# Patient Record
Sex: FEMALE | Race: WHITE
[De-identification: names, ages, dates, MRNs, and addresses within clinical notes are randomized per-mention and may not be internally consistent; named-entity substitution may affect disease eponyms.]

---

## 2018-12-21 NOTE — PREOPHP
Date of Admission:  2018



History Of Present Illness:  Ms. Barrera is a 33-year-old, ,  female,  3, para 
2-0-0-2, now at what will be 39 weeks gestation.  She is admitted for elective induction of labor sec
ondary to term pregnancy with favorable cervix and no symptoms of HSV infection.  She denies recent c
ough, cold, fever, chills or UTI symptoms.  Infant has been active.



Past Medical History:  Please see prenatal record.



Family History:  Please see prenatal record.



Review of Systems:

She reports no recent cough, cold, fever, or chills.  No breast knots or lumps.  The baby has been ac
tive.  She denies any vaginal bleeding or spotting.  She denies any HSV symptoms.  She denies any bow
el problems.



Physical Examination:

General:  A short-statured  female in no apparent distress. 

Neck:  Supple without adenopathy or thyromegaly. 

Lungs:  Clear. 

Cardiac:  Regular rate and rhythm without murmurs. 

Breasts:  Not examined. 

Abdomen:  Estimated fetal weight is 7+ pounds. 

Pelvic:  Cervix noted to be approximately 3 cm dilated, 2+ to 3 cm, vertex, at -1 station. 

Extremities:  Trace lower extremity edema.



Impression:  Term pregnancy, Rh negative blood type.  History of herpes simplex virus infection, now 
on Valtrex.



Plan:  The patient will be induced on Saturday.  She has signed a permit in my presence.





ROGELIO/CICI

DD:  2018 11:19:03Voice ID:  [id number]

## 2018-12-22 ENCOUNTER — HOSPITAL ENCOUNTER (INPATIENT)
Dept: HOSPITAL 97 - 2ND-WC | Age: 33
LOS: 1 days | Discharge: HOME | End: 2018-12-23
Attending: SPECIALIST | Admitting: SPECIALIST
Payer: COMMERCIAL

## 2018-12-22 VITALS — BODY MASS INDEX: 29.9 KG/M2

## 2018-12-22 DIAGNOSIS — O98.313: ICD-10-CM

## 2018-12-22 DIAGNOSIS — O26.893: Primary | ICD-10-CM

## 2018-12-22 DIAGNOSIS — Z67.91: ICD-10-CM

## 2018-12-22 DIAGNOSIS — Z3A.39: ICD-10-CM

## 2018-12-22 DIAGNOSIS — A60.00: ICD-10-CM

## 2018-12-22 LAB
HCT VFR BLD CALC: 39.3 % (ref 36–45)
LYMPHOCYTES # SPEC AUTO: 2.1 K/UL (ref 0.7–4.9)
PMV BLD: 8.6 FL (ref 7.6–11.3)
RBC # BLD: 4.5 M/UL (ref 3.86–4.86)
UA DIPSTICK W REFLEX MICRO PNL UR: (no result)

## 2018-12-22 PROCEDURE — 81003 URINALYSIS AUTO W/O SCOPE: CPT

## 2018-12-22 PROCEDURE — 85461 HEMOGLOBIN FETAL: CPT

## 2018-12-22 PROCEDURE — 86850 RBC ANTIBODY SCREEN: CPT

## 2018-12-22 PROCEDURE — 86592 SYPHILIS TEST NON-TREP QUAL: CPT

## 2018-12-22 PROCEDURE — 86901 BLOOD TYPING SEROLOGIC RH(D): CPT

## 2018-12-22 PROCEDURE — 85025 COMPLETE CBC W/AUTO DIFF WBC: CPT

## 2018-12-22 PROCEDURE — 87340 HEPATITIS B SURFACE AG IA: CPT

## 2018-12-22 PROCEDURE — 10907ZC DRAINAGE OF AMNIOTIC FLUID, THERAPEUTIC FROM PRODUCTS OF CONCEPTION, VIA NATURAL OR ARTIFICIAL OPENING: ICD-10-PCS

## 2018-12-22 PROCEDURE — 3E0234Z INTRODUCTION OF SERUM, TOXOID AND VACCINE INTO MUSCLE, PERCUTANEOUS APPROACH: ICD-10-PCS

## 2018-12-22 PROCEDURE — 3E033VJ INTRODUCTION OF OTHER HORMONE INTO PERIPHERAL VEIN, PERCUTANEOUS APPROACH: ICD-10-PCS

## 2018-12-22 PROCEDURE — 36415 COLL VENOUS BLD VENIPUNCTURE: CPT

## 2018-12-22 PROCEDURE — 85014 HEMATOCRIT: CPT

## 2018-12-22 NOTE — P.BOP
Preoperative diagnosis: 39 week pregnancy


Postoperative diagnosis: same, delivery viable male infant


Primary procedure: SCVD


Secondary procedure: repair 2 laceration


Estimated blood loss: 200


Anesthesia: Local


Complications: None


Transferred to: Other (272)


Condition: Good

## 2018-12-23 VITALS — DIASTOLIC BLOOD PRESSURE: 68 MMHG | TEMPERATURE: 97.4 F | SYSTOLIC BLOOD PRESSURE: 118 MMHG

## 2018-12-25 LAB — RPR SER QL: (no result)

## 2019-03-06 NOTE — OP
Surgeon:  Kd Basurto MD



Ms. Barrera is a 33-year-old   female,  3, para 2-0-0-2, at 39 weeks gestation,
 admitted for elective induction of labor.  She was noted to be approximately a 1 + 2 cm on admission
.  After rupture of membranes and Pitocin induction of labor, she had a first stage of labor of 4 ye
rs and 13 minutes, second stage of labor of 7 minutes.  She delivered by spontaneous controlled vagin
al delivery over intact perineum a 7 pounds 7 ounce male infant.  After delayed cord clamping, the co
rd was clamped, cut, and the infant placed on mother's upper abdomen.  Cord blood was obtained.  Plac
enta was spontaneously expelled, appeared to be intact.  Intrauterine exam revealed no retained place
ntal fragments.  She delivered with 1 dose of Stadol 1 mg and 12.5 mg IV dose of Phenergan x1.  Estim
ated total blood loss was less than 200 cc.  The patient tolerated all procedures well.





ROGELIO/CICI

DD:  2019 21:10:07Voice ID:  [id number]

DT:  2019 04:56:58Report ID:  [id number]

## 2019-10-04 ENCOUNTER — HOSPITAL ENCOUNTER (OUTPATIENT)
Dept: HOSPITAL 97 - ER | Age: 34
Setting detail: OBSERVATION
LOS: 1 days | Discharge: HOME | End: 2019-10-05
Attending: SURGERY | Admitting: SURGERY
Payer: COMMERCIAL

## 2019-10-04 VITALS — BODY MASS INDEX: 25.6 KG/M2

## 2019-10-04 DIAGNOSIS — K35.80: Primary | ICD-10-CM

## 2019-10-04 DIAGNOSIS — Z88.3: ICD-10-CM

## 2019-10-04 DIAGNOSIS — Z88.6: ICD-10-CM

## 2019-10-04 LAB
BUN BLD-MCNC: 15 MG/DL (ref 7–18)
GLUCOSE SERPLBLD-MCNC: 91 MG/DL (ref 74–106)
HCT VFR BLD CALC: 41.8 % (ref 36–45)
LYMPHOCYTES # SPEC AUTO: 1.9 K/UL (ref 0.7–4.9)
PMV BLD: 8.6 FL (ref 7.6–11.3)
POTASSIUM SERPL-SCNC: 3.6 MMOL/L (ref 3.5–5.1)
RBC # BLD: 4.81 M/UL (ref 3.86–4.86)

## 2019-10-04 PROCEDURE — 80048 BASIC METABOLIC PNL TOTAL CA: CPT

## 2019-10-04 PROCEDURE — 0DTJ4ZZ RESECTION OF APPENDIX, PERCUTANEOUS ENDOSCOPIC APPROACH: ICD-10-PCS

## 2019-10-04 PROCEDURE — 85025 COMPLETE CBC W/AUTO DIFF WBC: CPT

## 2019-10-04 PROCEDURE — 99285 EMERGENCY DEPT VISIT HI MDM: CPT

## 2019-10-04 PROCEDURE — 96365 THER/PROPH/DIAG IV INF INIT: CPT

## 2019-10-04 PROCEDURE — 96361 HYDRATE IV INFUSION ADD-ON: CPT

## 2019-10-04 PROCEDURE — 88304 TISSUE EXAM BY PATHOLOGIST: CPT

## 2019-10-04 PROCEDURE — 81025 URINE PREGNANCY TEST: CPT

## 2019-10-04 PROCEDURE — 81003 URINALYSIS AUTO W/O SCOPE: CPT

## 2019-10-04 PROCEDURE — 74177 CT ABD & PELVIS W/CONTRAST: CPT

## 2019-10-04 PROCEDURE — 44970 LAPAROSCOPY APPENDECTOMY: CPT

## 2019-10-04 PROCEDURE — 36415 COLL VENOUS BLD VENIPUNCTURE: CPT

## 2019-10-04 RX ADMIN — SODIUM CHLORIDE SCH MLS: 0.9 INJECTION, SOLUTION INTRAVENOUS at 17:18

## 2019-10-04 NOTE — ER
Nurse's Notes                                                                                     

 [facility]                                                                 

Name: Nelly Barrera                                                                                 

Age: 34 yrs                                                                                       

Sex: Female                                                                                       

: 1985                                                                                   

MRN: [id number]                                                                                   

Arrival Date: 10/04/2019                                                                          

Time: 10:08                                                                                       

Account#: [id number]                                                                            

Bed 26                                                                                            

Private MD:                                                                                       

Diagnosis: Acute appendicitis                                                                     

                                                                                                  

Presentation:                                                                                     

10/04                                                                                             

10:35 Presenting complaint: Patient states: nausea that began last night with RLQ pain. Seen  ss  

      by PCP this morning who sent patient to r/o appy. Transition of care: patient was not       

      received from another setting of care. Onset of symptoms was October 3, 2019. Risk          

      Assessment: Do you want to hurt yourself or someone else? Patient reports no desire to      

      harm self or others. Initial Sepsis Screen: Does the patient meet any 2 criteria? No.       

      Patient's initial sepsis screen is negative. Does the patient have a suspected source       

      of infection? No. Patient's initial sepsis screen is negative. Care prior to arrival:       

      None.                                                                                       

10:35 Method Of Arrival: Ambulatory                                                           ss  

10:35 Acuity: KELLY 3                                                                           ss  

                                                                                                  

OB/GYN:                                                                                           

10:38 LMP 2019                                                                            ss  

                                                                                                  

Historical:                                                                                       

- Allergies:                                                                                      

10:38 Codeine;                                                                                ss  

10:38 Zithromax;                                                                              ss  

10:38 Aspirin;                                                                                ss  

10:38 Red Dye;                                                                                ss  

10:38 Tape;                                                                                   ss  

- Home Meds:                                                                                      

10:38 None [Active];                                                                          ss  

- PMHx:                                                                                           

10:38 None;                                                                                   ss  

- PSHx:                                                                                           

10:38 None;                                                                                   ss  

                                                                                                  

- Immunization history:: Adult Immunizations up to date.                                          

- Social history:: Smoking status: Patient/guardian denies using tobacco.                         

- Ebola Screening: : Patient denies exposure to infectious person Patient denies travel           

  to an Ebola-affected area in the 21 days before illness onset.                                  

                                                                                                  

                                                                                                  

Screenin:30 Abuse screen: Denies threats or abuse. Denies injuries from another. Nutritional        aj1 

      screening: No deficits noted. Tuberculosis screening: No symptoms or risk factors           

      identified.                                                                                 

15:16 Fall Risk No fall in past 12 months (0 pts). No secondary diagnosis (0 pts). IV access  aj1 

      (20 points). Ambulatory Aid- None/Bed Rest/Nurse Assist (0 pts). Gait- Normal/Bed           

      Rest/Wheelchair (0 pts) Mental Status- Oriented to own ability (0 pts). Total Payne         

      Fall Scale indicates No Risk (0-24 pts).                                                    

                                                                                                  

Assessment:                                                                                       

11:30 General: Appears in no apparent distress. comfortable, Behavior is calm, cooperative,   aj1 

      appropriate for age. Pain: Complains of pain in right lower quadrant Pain does not          

      radiate. Pain began 1 day ago. Is continuous. Neuro: Level of Consciousness is awake,       

      alert, obeys commands, Oriented to person, place, time, situation. Cardiovascular:          

      Patient's skin is warm and dry. Respiratory: Airway is patent Respiratory effort is         

      even, unlabored, Respiratory pattern is regular, symmetrical. GI: Abdomen is                

      non-distended, Bowel sounds present X 4 quads. Abd is soft X 4 quads Abdomen is tender      

      to palpation in right lower quadrant Reports nausea, Patient currently denies diarrhea,     

      vomiting. : No signs and/or symptoms were reported regarding the genitourinary            

      system. EENT: No signs and/or symptoms were reported regarding the EENT system. Derm:       

      No signs and/or symptoms reported regarding the dermatologic system. Skin is pink, warm     

      \T\ dry. normal. Musculoskeletal: No signs and/or symptoms reported regarding the           

      musculoskeletal system. Circulation, motion, and sensation intact.                          

11:44 Reassessment: Notified CT that patient finished her contrast at 11:15.                  aj1 

12:30 Reassessment: Patient appears in no apparent distress at this time. No changes from     aj1 

      previously documented assessment. Patient and/or family updated on plan of care and         

      expected duration. Pain level reassessed. Patient is alert, oriented x 3, equal             

      unlabored respirations, skin warm/dry/pink.                                                 

13:30 Reassessment: Patient and/or family updated on plan of care and expected duration. Pain aj1 

      level reassessed. General: Appears in no apparent distress. comfortable, Behavior is        

      calm, cooperative. Neuro: Level of Consciousness is awake, alert, obeys commands,           

      Oriented to person, place, time, situation. Cardiovascular: Patient's skin is warm and      

      dry. Respiratory: Airway is patent Respiratory effort is even, unlabored, Respiratory       

      pattern is regular, symmetrical. GI: Abdomen is non-distended. Derm: No signs and/or        

      symptoms reported regarding the dermatologic system. Skin is pink, warm \T\ dry. normal.    

      Musculoskeletal: Circulation, motion, and sensation intact.                                 

14:30 Reassessment: Patient appears in no apparent distress at this time. No changes from     aj1 

      previously documented assessment. Patient and/or family updated on plan of care and         

      expected duration. Pain level reassessed. Patient is alert, oriented x 3, equal             

      unlabored respirations, skin warm/dry/pink.                                                 

14:45 Reassessment: Dr. Storm at bedside to discuss POC with patient.                       aj1 

14:55 Reassessment: Patient changed into hospital gown in anticipation of surgery. All of the aj1 

      patient's belongings were given to her .                                             

                                                                                                  

Vital Signs:                                                                                      

10:38  / 81; Pulse 94; Resp 16; Temp 98.4(TE); Pulse Ox 100% on R/A; Weight 57.61 kg;   ss  

      Height 4 ft. 10 in. (147.32 cm); Pain 2/10;                                                 

12:00  / 75; Pulse 92; Resp 18; Pulse Ox 100% on R/A;                                   aj1 

13:00  / 65; Pulse 88; Resp 18; Pulse Ox 97% ;                                          aj1 

14:00  / 78; Pulse 90; Resp 20; Pulse Ox 100% ;                                         aj1 

15:02  / 75; Pulse 82; Resp 16; Temp 98.2; Pulse Ox 100% on R/A;                        aj1 

10:38 Body Mass Index 26.54 (57.61 kg, 147.32 cm)                                             ss  

                                                                                                  

ED Course:                                                                                        

10:08 Patient arrived in ED.                                                                  as  

10:37 Triage completed.                                                                       ss  

10:38 Arm band placed on right wrist.                                                         ss  

10:51 Jannette Hudson FNP-C is PHCP.                                                        kb  

10:51 Jose Guzmán MD is Attending Physician.                                             kb  

11:17 Kalina Curtis, RN is Primary Nurse.                                                   aj1 

11:30 Patient has correct armband on for positive identification. Bed in low position. Call   aj1 

      light in reach.                                                                             

11:30 No provider procedures requiring assistance completed.                                  aj1 

11:44 Inserted saline lock: 20 gauge in right antecubital area, using aseptic technique.      aj1 

      Blood collected.                                                                            

12:37 CT Abd/Pelvis - PO and IV Contrast In Process Unspecified.                              EDMS

12:56 Jonh Storm MD is Hospitalizing Provider.                                           kb  

15:14 Patient admitted, IV remains in place.                                                  aj1 

                                                                                                  

Administered Medications:                                                                         

13:30 Drug: NS 0.9% 1000 ml Route: IV; Rate: 125 ml/hr; Site: right antecubital;              aj1 

15:12 Follow up: IV Status: Infusion continued upon admission; IV Intake: 200ml               aj1 

13:30 Drug: Zosyn 3.375 grams Route: IVPB; Infused Over: 60 mins; Site: right antecubital;    aj1 

14:30 Follow up: IV Status: Completed infusion; IV Intake: 100ml                              aj1 

                                                                                                  

                                                                                                  

Intake:                                                                                           

14:30 IV: 100ml; Total: 100ml.                                                                aj1 

15:12 IV: 200ml; Total: 300ml.                                                                aj1 

                                                                                                  

Outcome:                                                                                          

12:56 Decision to Hospitalize by Provider.                                                    kb  

15:16 Admitted to OR accompanied by nurse, via wheelchair, with chart.                        aj1 

15:16 Condition: good                                                                             

15:16 Instructed on the need for admit, Demonstrated understanding of instructions.               

15:16 Patient left the ED.                                                                    aj1 

                                                                                                  

Signatures:                                                                                       

Dispatcher [employer]                           EDJannette Hurley, FNP-C                 PJP-Kalina Dey, RN                     RN   aj1                                                  

Tameka Centeno Shelby, RN                      RN   ss                                                   

                                                                                                  

**************************************************************************************************

## 2019-10-04 NOTE — EDPHYS
Physician Documentation                                                                           

 [facility]                                                                 

Name: Nelly Barrera                                                                                 

Age: 34 yrs                                                                                       

Sex: Female                                                                                       

: 1985                                                                                   

MRN: [id number]                                                                                   

Arrival Date: 10/04/2019                                                                          

Time: 10:08                                                                                       

Account#: [id number]                                                                            

Bed 26                                                                                            

Private MD:                                                                                       

ED Physician Jose Guzmán                                                                      

HPI:                                                                                              

10/04                                                                                             

11:46 This 34 yrs old  Female presents to ER via Ambulatory with complaints of       kb  

      Abdominal Pain - r/o appendicitis.                                                          

11:46 The patient presents with abdominal pain right lower quadrant. Onset: The               kb  

      symptoms/episode began/occurred last night. The symptoms do not radiate. Associated         

      signs and symptoms: Pertinent positives: nausea, Pertinent negatives: anorexia, blood       

      in stools, chest pain, constipation, diarrhea, dysuria, fever, headache, hematuria,         

      palpitations, shortness of breath, vaginal discharge, vomiting, vomiting blood. The         

      symptoms are described as constant. Modifying factors: The symptoms are alleviated by       

      nothing, the symptoms are aggravated by pressure. Severity of pain: At its worst the        

      pain was moderate in the emergency department the pain is unchanged. The patient has        

      not experienced similar symptoms in the past. The patient has been recently seen by a       

      physician: the patient's primary care provider, earlier today, with similar presenting      

      complaints. RLQ pain with nausea that started last night and has progressively gotten       

      wrose.                                                                                      

                                                                                                  

OB/GYN:                                                                                           

10:38 LMP 2019                                                                            ss  

                                                                                                  

Historical:                                                                                       

- Allergies:                                                                                      

10:38 Codeine;                                                                                ss  

10:38 Zithromax;                                                                              ss  

10:38 Aspirin;                                                                                ss  

10:38 Red Dye;                                                                                ss  

10:38 Tape;                                                                                   ss  

- Home Meds:                                                                                      

10:38 None [Active];                                                                          ss  

- PMHx:                                                                                           

10:38 None;                                                                                   ss  

- PSHx:                                                                                           

10:38 None;                                                                                   ss  

                                                                                                  

- Immunization history:: Adult Immunizations up to date.                                          

- Social history:: Smoking status: Patient/guardian denies using tobacco.                         

- Ebola Screening: : Patient denies exposure to infectious person Patient denies travel           

  to an Ebola-affected area in the 21 days before illness onset.                                  

                                                                                                  

                                                                                                  

ROS:                                                                                              

11:45 Constitutional: Negative for fever, chills, and weight loss, ENT: Negative for injury,  kb  

      pain, and discharge, Neck: Negative for injury, pain, and swelling, Cardiovascular:         

      Negative for chest pain, palpitations, and edema, Respiratory: Negative for shortness       

      of breath, cough, wheezing, and pleuritic chest pain, Back: Negative for injury and         

      pain, : Negative for injury, bleeding, discharge, and swelling, MS/Extremity:             

      Negative for injury and deformity, Skin: Negative for injury, rash, and discoloration,      

      Neuro: Negative for headache, weakness, numbness, tingling, and seizure.                    

11:45 Abdomen/GI: Positive for abdominal pain, nausea, Negative for vomiting, diarrhea,           

      constipation.                                                                               

                                                                                                  

Exam:                                                                                             

11:46 Constitutional:  This is a well developed, well nourished patient who is awake, alert,  kb  

      and in no acute distress. Head/Face:  Normocephalic, atraumatic. ENT:  Nares patent. No     

      nasal discharge, no septal abnormalities noted.  Tympanic membranes are normal and          

      external auditory canals are clear.  Oropharynx with no redness, swelling, or masses,       

      exudates, or evidence of obstruction, uvula midline.  Mucous membranes moist. Neck:         

      Trachea midline, no thyromegaly or masses palpated, and no cervical lymphadenopathy.        

      Supple, full range of motion without nuchal rigidity, or vertebral point tenderness.        

      No Meningismus. Chest/axilla:  Normal chest wall appearance and motion.  Nontender with     

      no deformity.  No lesions are appreciated. Cardiovascular:  Regular rate and rhythm         

      with a normal S1 and S2.  No gallops, murmurs, or rubs.  Normal PMI, no JVD.  No pulse      

      deficits. Respiratory:  Lungs have equal breath sounds bilaterally, clear to                

      auscultation and percussion.  No rales, rhonchi or wheezes noted.  No increased work of     

      breathing, no retractions or nasal flaring. Back:  No spinal tenderness.  No                

      costovertebral tenderness.  Full range of motion. Skin:  Warm, dry with normal turgor.      

      Normal color with no rashes, no lesions, and no evidence of cellulitis. MS/ Extremity:      

      Pulses equal, no cyanosis.  Neurovascular intact.  Full, normal range of motion. Neuro:     

       Awake and alert, GCS 15, oriented to person, place, time, and situation.  Cranial          

      nerves II-XII grossly intact.  Motor strength 5/5 in all extremities.  Sensory grossly      

      intact.  Cerebellar exam normal.  Normal gait.                                              

11:46 Abdomen/GI: Inspection: abdomen appears normal, Bowel sounds: normal, in all quadrants,     

      Palpation: soft, in all quadrants, moderate abdominal tenderness, in the right lower        

      quadrant.                                                                                   

                                                                                                  

Vital Signs:                                                                                      

10:38  / 81; Pulse 94; Resp 16; Temp 98.4(TE); Pulse Ox 100% on R/A; Weight 57.61 kg;   ss  

      Height 4 ft. 10 in. (147.32 cm); Pain 2/10;                                                 

12:00  / 75; Pulse 92; Resp 18; Pulse Ox 100% on R/A;                                   aj1 

13:00  / 65; Pulse 88; Resp 18; Pulse Ox 97% ;                                          aj1 

14:00  / 78; Pulse 90; Resp 20; Pulse Ox 100% ;                                         aj1 

15:02  / 75; Pulse 82; Resp 16; Temp 98.2; Pulse Ox 100% on R/A;                        aj1 

10:38 Body Mass Index 26.54 (57.61 kg, 147.32 cm)                                             ss  

                                                                                                  

MDM:                                                                                              

11:05 Patient medically screened.                                                             kb  

11:45 Data reviewed: vital signs, nurses notes. Data interpreted: Pulse oximetry: on room air kb  

      is 100 %. Interpretation: normal.                                                           

12:49 Counseling: I had a detailed discussion with the patient and/or guardian regarding: the kb  

      historical points, exam findings, and any diagnostic results supporting the                 

      discharge/admit diagnosis, lab results, radiology results, the need for further work-up     

      and treatment in the hospital. Physician consultation: Jonh Storm MD was contacted       

      at 12:56, regarding admission, to the medical/surgical unit. patient's condition, and       

      will see patient in ED, shortly.                                                            

                                                                                                  

10/04                                                                                             

11:06 Order name: Basic Metabolic Panel; Complete Time: 12:13                                 kb  

10/04                                                                                             

11:06 Order name: CBC with Diff; Complete Time: 12:06                                         kb  

10/04                                                                                             

11:06 Order name: CT Abd/Pelvis - PO and IV Contrast                                          kb  

10/04                                                                                             

12:18 Order name: Urine Dipstick--Ancillary (enter results); Complete Time: 12:36             kb  

10/04                                                                                             

12:18 Order name: Urine Pregnancy--Ancillary (enter results); Complete Time: 12:36            kb  

10/04                                                                                             

11:06 Order name: IV Saline Lock; Complete Time: 11:43                                        kb  

10/04                                                                                             

11:06 Order name: Labs collected and sent; Complete Time: 11:43                               kb  

10/04                                                                                             

12:16 Order name: Urine Dipstick-Ancillary (obtain specimen); Complete Time: 13:00            kb  

                                                                                                  

Administered Medications:                                                                         

13:30 Drug: NS 0.9% 1000 ml Route: IV; Rate: 125 ml/hr; Site: right antecubital;              aj1 

15:12 Follow up: IV Status: Infusion continued upon admission; IV Intake: 200ml               aj1 

13:30 Drug: Zosyn 3.375 grams Route: IVPB; Infused Over: 60 mins; Site: right antecubital;    aj1 

14:30 Follow up: IV Status: Completed infusion; IV Intake: 100ml                              aj 

                                                                                                  

                                                                                                  

Disposition:                                                                                      

10/04/19 12:56 Hospitalization ordered by Jonh Storm for Observation. Preliminary             

  diagnosis is Acute appendicitis.                                                                

- Bed requested for Telemetry/MedSurg (observation).                                              

- Status is Observation.                                                                      aj1 

- Condition is Stable.                                                                            

- Problem is new.                                                                                 

- Symptoms are unchanged.                                                                         

UTI on Admission? No                                                                              

                                                                                                  

                                                                                                  

                                                                                                  

Addendum:                                                                                         

10/06/2019                                                                                        

     07:52 Co-signature as Attending Physician, Jose Guzmán MD I agree with the assessment and  c
ha

           plan of care.                                                                          

                                                                                                  

Signatures:                                                                                       

Dispatcher [employer]                           EDMS                                                 

Jannette Hudson, JUAN-C                 FNP-Ckb                                                   

Kalina Curtis, RN                     RN   aj1                                                  

Jose Guzmán MD MD   [facility]                                                  

Dasha Lowe RN                      RN   ss                                                   

                                                                                                  

Corrections: (The following items were deleted from the chart)                                    

10/04                                                                                             

15:16 12:56 Hospitalization Ordered by Jonh Storm MD for Observation. Preliminary          aj1 

      diagnosis is Acute appendicitis. Bed requested for Telemetry/MedSurg (observation).         

      Status is Observation. Condition is Stable. Problem is new. Symptoms are unchanged. UTI     

      on Admission? No. kb                                                                        

                                                                                                  

**************************************************************************************************

## 2019-10-04 NOTE — P.OP
Preoperative diagnosis: Acute abdomen


Postoperative diagnosis: Acute appendicitis


Primary procedure: Laparoscopic appendectomy


Anesthesia: General


Estimated blood loss: Less than 10 cc


Specimen: 1 appendix


Operative Technique: 





The patient was brought to the operating room, placed supine on the table.  

After the induction of adequate general endotracheal anesthesia, the area of 

the abdomen was prepped with a DuraPrep solution, and she was draped in the 

usual aseptic manner.





A subumbilical incision was made.  This was brought down through the skin and 

subcutaneous tissue.  The Visiport was cavity and created pneumoperitoneum to 

approximately 12 mm of mercury.  Under direct vision a 5 mm trocar was placed 

in the lower midline and another 5 mm in the right upper quadrant.  The patient 

was then positioned in Trendelenburg and rolled to the left side.  We were able 

to visualize right lower quadrant.  .  The appendix was then gently dissected 

from the surrounding structures.  The junction of the appendix with the with 

the cecum was identified.  An opening was made in the mesentery of the 

appendix.  The 10 mm trocar was now converted to a 12 with the camera moved to 

the right upper port with a 5 mm view.  The linear Stapler was introduced into 

the peritoneal cavity.  It was placed across the base of the appendix and 

fired.  A vascular reload was then placed into the Stapler.  The mesentery of 

the appendix was then taken down.  The appendix having been  was 

placed into an Endo-Catch, brought out through the umbilical port site.  

Attention was turned back towards the right lower quadrant.  The area was 

gently irrigated with the saline solution.  The effluent was aspirated.   

Attention was turned towards the umbilical trocar.  Using the endo-close 

absorbable sutures were placed to close the defect.  The patient was now 

returned to the neutral position on the OR table.  The pneumoperitoneum was 

collapsed, the umbilical sutures tied, and staples applied to the skin.





At the end of the procedure the patient was in stable condition and sent to the 

recovery room.  Needle sponge and instrument count were correct.  1 specimen 

was sent for histopathology.  Sterile dressings had been applied.


Complications: None


Transferred to: Recovery Room


Condition: Good

## 2019-10-04 NOTE — RAD REPORT
EXAM DESCRIPTION:  CT - Abdomen   Pelvis W Contrast - 10/4/2019 7:17 pm

 

CLINICAL HISTORY:   Abdominal pain.

 

COMPARISON:  None.

 

TECHNIQUE:  Computed axial tomography of the abdomen and pelvis was obtained. 100 cc Isovue-300 is ad
ministered intravenously. Oral contrast was given.

 

All CT scans are performed using dose optimization technique as appropriate and may include automated
 exposure control or mA/KV adjustment according to patient size.

 

FINDINGS:

The liver, spleen, pancreas, adrenals and kidneys appear unremarkable.

 

Mild enlargement of the appendix. No periappendiceal stranding. No abscess.

 

There is no evidence of diverticulitis

 

Small umbilical hernia contains fat

 

IMPRESSION:  Early appendicitis

## 2019-10-04 NOTE — P.HP
Date of Service: 10/04/19





PC:  This 34-year-old female presents emergency room with right lower quadrant 

abdominal pain for diagnosis and treatment.





HPC:  Patient had not been feeling well last night.  Had nausea all evening.  

When she woke up this morning noticed that her pain will consider being around 

her emboli kiss had moved to the right lower quadrant. Came to the emergency 

room for diagnosis





PMH:  Chief 3 para 3 vaginal deliveries





PSHx:  No prior surgeries





SOC:  Allergic to aspirin and hydrocodone tape





SYS REVIEW:  No cough, wheeze, shortness of breath.  No chest pain or 

palpitations.  Denies any urinary complaints





O/E awake alert vital signs are stable, mildly uncomfortable








HEENT:  Not jaundice





Chest:  Chest movement equal bilaterally





ABD:  Tender with mild guarding in the right lower quadrant





LOCO:  Intact





DATA:  10.8 white cell count and CT scan supports clinical diagnosis of acute 

appendicitis





IMPRESSION:  Acute abdomen with appendicitis





PLAN:  I will take him to the operating room for laparoscopic possible open 

appendectomy.  The risks of this procedure have been discussed.  The 

possibility of bleeding, infection, injury to bowel and blood vessels has been 

described.  The possible need for an open and/or further surgeries and 

procedures was discussed.  She understands and wants us to proceed.  Her 

 is here.

## 2019-10-05 VITALS — OXYGEN SATURATION: 99 %

## 2019-10-05 VITALS — SYSTOLIC BLOOD PRESSURE: 119 MMHG | TEMPERATURE: 98.3 F | DIASTOLIC BLOOD PRESSURE: 88 MMHG

## 2019-10-05 RX ADMIN — SODIUM CHLORIDE SCH: 0.9 INJECTION, SOLUTION INTRAVENOUS at 07:56

## 2019-10-05 RX ADMIN — SODIUM CHLORIDE SCH MLS: 0.9 INJECTION, SOLUTION INTRAVENOUS at 00:14
